# Patient Record
Sex: MALE | Race: WHITE | NOT HISPANIC OR LATINO
[De-identification: names, ages, dates, MRNs, and addresses within clinical notes are randomized per-mention and may not be internally consistent; named-entity substitution may affect disease eponyms.]

---

## 2017-05-04 ENCOUNTER — NON-APPOINTMENT (OUTPATIENT)
Age: 32
End: 2017-05-04

## 2017-05-04 ENCOUNTER — APPOINTMENT (OUTPATIENT)
Dept: CARDIOLOGY | Facility: CLINIC | Age: 32
End: 2017-05-04

## 2017-05-04 VITALS
TEMPERATURE: 98.3 F | SYSTOLIC BLOOD PRESSURE: 120 MMHG | WEIGHT: 170 LBS | OXYGEN SATURATION: 100 % | HEART RATE: 65 BPM | DIASTOLIC BLOOD PRESSURE: 82 MMHG | HEIGHT: 69 IN | RESPIRATION RATE: 17 BRPM | BODY MASS INDEX: 25.18 KG/M2

## 2017-05-04 DIAGNOSIS — E78.5 HYPERLIPIDEMIA, UNSPECIFIED: ICD-10-CM

## 2017-06-20 ENCOUNTER — LABORATORY RESULT (OUTPATIENT)
Age: 32
End: 2017-06-20

## 2017-06-20 ENCOUNTER — APPOINTMENT (OUTPATIENT)
Dept: INTERNAL MEDICINE | Facility: CLINIC | Age: 32
End: 2017-06-20

## 2017-06-20 VITALS
HEART RATE: 64 BPM | RESPIRATION RATE: 17 BRPM | DIASTOLIC BLOOD PRESSURE: 65 MMHG | BODY MASS INDEX: 24.96 KG/M2 | SYSTOLIC BLOOD PRESSURE: 107 MMHG | TEMPERATURE: 97.6 F | WEIGHT: 169 LBS | OXYGEN SATURATION: 96 %

## 2017-06-20 DIAGNOSIS — Z00.00 ENCOUNTER FOR GENERAL ADULT MEDICAL EXAMINATION W/OUT ABNORMAL FINDINGS: ICD-10-CM

## 2017-06-20 DIAGNOSIS — L30.9 DERMATITIS, UNSPECIFIED: ICD-10-CM

## 2017-06-20 DIAGNOSIS — S92.309A FRACTURE OF UNSPECIFIED METATARSAL BONE(S), UNSPECIFIED FOOT, INITIAL ENCOUNTER FOR CLOSED FRACTURE: ICD-10-CM

## 2017-06-20 DIAGNOSIS — Z23 ENCOUNTER FOR IMMUNIZATION: ICD-10-CM

## 2017-06-21 ENCOUNTER — RESULT REVIEW (OUTPATIENT)
Age: 32
End: 2017-06-21

## 2017-06-21 DIAGNOSIS — F40.10 SOCIAL PHOBIA, UNSPECIFIED: ICD-10-CM

## 2017-06-21 DIAGNOSIS — R79.89 OTHER SPECIFIED ABNORMAL FINDINGS OF BLOOD CHEMISTRY: ICD-10-CM

## 2017-06-21 DIAGNOSIS — Z23 ENCOUNTER FOR IMMUNIZATION: ICD-10-CM

## 2017-06-21 DIAGNOSIS — R80.9 PROTEINURIA, UNSPECIFIED: ICD-10-CM

## 2017-06-21 LAB
ALBUMIN SERPL ELPH-MCNC: 4.3 G/DL
ALP BLD-CCNC: 85 U/L
ALT SERPL-CCNC: 40 U/L
ANION GAP SERPL CALC-SCNC: 12 MMOL/L
APPEARANCE: ABNORMAL
AST SERPL-CCNC: 34 U/L
BASOPHILS # BLD AUTO: 0.02 K/UL
BASOPHILS NFR BLD AUTO: 0.5 %
BILIRUB SERPL-MCNC: 0.4 MG/DL
BILIRUBIN URINE: NEGATIVE
BLOOD URINE: NEGATIVE
BUN SERPL-MCNC: 20 MG/DL
CALCIUM SERPL-MCNC: 9 MG/DL
CHLORIDE SERPL-SCNC: 102 MMOL/L
CHOLEST SERPL-MCNC: 163 MG/DL
CHOLEST/HDLC SERPL: 3.3 RATIO
CO2 SERPL-SCNC: 27 MMOL/L
COLOR: YELLOW
CREAT SERPL-MCNC: 1.34 MG/DL
EOSINOPHIL # BLD AUTO: 0.18 K/UL
EOSINOPHIL NFR BLD AUTO: 4.1 %
GLUCOSE QUALITATIVE U: NORMAL MG/DL
GLUCOSE SERPL-MCNC: 71 MG/DL
HBA1C MFR BLD HPLC: 5.4 %
HCT VFR BLD CALC: 41.4 %
HDLC SERPL-MCNC: 49 MG/DL
HGB BLD-MCNC: 13.3 G/DL
IMM GRANULOCYTES NFR BLD AUTO: 0.2 %
KETONES URINE: NEGATIVE
LDLC SERPL CALC-MCNC: 105 MG/DL
LEUKOCYTE ESTERASE URINE: NEGATIVE
LYMPHOCYTES # BLD AUTO: 1.56 K/UL
LYMPHOCYTES NFR BLD AUTO: 35.4 %
MAN DIFF?: NORMAL
MCHC RBC-ENTMCNC: 29.6 PG
MCHC RBC-ENTMCNC: 32.1 GM/DL
MCV RBC AUTO: 92 FL
MONOCYTES # BLD AUTO: 0.33 K/UL
MONOCYTES NFR BLD AUTO: 7.5 %
NEUTROPHILS # BLD AUTO: 2.31 K/UL
NEUTROPHILS NFR BLD AUTO: 52.3 %
NITRITE URINE: NEGATIVE
PH URINE: 7.5
PLATELET # BLD AUTO: 198 K/UL
POTASSIUM SERPL-SCNC: 4.4 MMOL/L
PROT SERPL-MCNC: 7.2 G/DL
PROTEIN URINE: NEGATIVE MG/DL
RBC # BLD: 4.5 M/UL
RBC # FLD: 13.7 %
SODIUM SERPL-SCNC: 141 MMOL/L
SPECIFIC GRAVITY URINE: 1.02
TRIGL SERPL-MCNC: 43 MG/DL
TSH SERPL-ACNC: 1.72 UIU/ML
UROBILINOGEN URINE: NORMAL MG/DL
WBC # FLD AUTO: 4.41 K/UL

## 2017-12-18 PROBLEM — Z23 NEED FOR INFLUENZA VACCINATION: Status: ACTIVE | Noted: 2017-12-18

## 2017-12-20 ENCOUNTER — APPOINTMENT (OUTPATIENT)
Dept: INTERNAL MEDICINE | Facility: CLINIC | Age: 32
End: 2017-12-20
Payer: MEDICAID

## 2017-12-20 DIAGNOSIS — Z23 ENCOUNTER FOR IMMUNIZATION: ICD-10-CM

## 2017-12-20 PROCEDURE — 90471 IMMUNIZATION ADMIN: CPT

## 2017-12-20 PROCEDURE — 90632 HEPA VACCINE ADULT IM: CPT

## 2019-02-20 ENCOUNTER — APPOINTMENT (OUTPATIENT)
Dept: CARDIOLOGY | Facility: CLINIC | Age: 34
End: 2019-02-20

## 2019-03-07 ENCOUNTER — APPOINTMENT (OUTPATIENT)
Dept: CARDIOLOGY | Facility: CLINIC | Age: 34
End: 2019-03-07
Payer: MEDICAID

## 2019-03-07 ENCOUNTER — NON-APPOINTMENT (OUTPATIENT)
Age: 34
End: 2019-03-07

## 2019-03-07 VITALS
SYSTOLIC BLOOD PRESSURE: 129 MMHG | OXYGEN SATURATION: 100 % | BODY MASS INDEX: 24.37 KG/M2 | TEMPERATURE: 97.5 F | RESPIRATION RATE: 17 BRPM | WEIGHT: 165 LBS | HEART RATE: 70 BPM | DIASTOLIC BLOOD PRESSURE: 73 MMHG

## 2019-03-07 DIAGNOSIS — R94.31 ABNORMAL ELECTROCARDIOGRAM [ECG] [EKG]: ICD-10-CM

## 2019-03-07 PROCEDURE — 93000 ELECTROCARDIOGRAM COMPLETE: CPT

## 2019-03-07 PROCEDURE — 99213 OFFICE O/P EST LOW 20 MIN: CPT

## 2019-03-07 NOTE — HISTORY OF PRESENT ILLNESS
[FreeTextEntry1] : Patient is feeling well. His ECG is normal. Patient denies CP, SOB, palpitations, or lightheadedness. Patient reports that he weight trains 4 times a week and plays tennis once a week. \par \par 33-year-old male with HLD  presents for routine evaluation.  Patient was last seen on 5/4/17.\par \par (1) HLD - He is scheduled to see his PCP for physical.  His ECG is normal.  Patient was reassured. \par \par (2) Followup - as needed.\par \par 4/18/16.  Patient wants an ECG.  Patient denies CP, SOB, palpitations, or lightheadedness.  Patient reports runny nose and sore throat; he thinks it may be due allergy.

## 2019-03-19 PROBLEM — R94.31 ABNORMAL ECG: Status: ACTIVE | Noted: 2019-03-19

## 2020-10-31 NOTE — DISCUSSION/SUMMARY
[FreeTextEntry1] : 32-year-old male with HLD  presents for routine evaluation.  Patient was last seen on 4/18/16.  Patient wants an ECG.  Patient denies CP, SOB, palpitations, or lightheadedness.  Patient reports runny nose and sore throat; he thinks it may be due allergy.\par \par (1) HLD - He is scheduled to see his PCP for physical.  His ECG is normal.  Patient was reassured. \par \par (2) Followup - as needed.
Never smoker

## 2024-08-05 ENCOUNTER — NON-APPOINTMENT (OUTPATIENT)
Age: 39
End: 2024-08-05

## 2024-08-08 ENCOUNTER — APPOINTMENT (OUTPATIENT)
Dept: UROLOGY | Facility: CLINIC | Age: 39
End: 2024-08-08

## 2024-08-08 PROBLEM — R35.0 FREQUENCY OF MICTURITION: Status: ACTIVE | Noted: 2024-08-08

## 2024-08-08 PROCEDURE — 51798 US URINE CAPACITY MEASURE: CPT

## 2024-08-08 PROCEDURE — G2211 COMPLEX E/M VISIT ADD ON: CPT | Mod: NC

## 2024-08-08 PROCEDURE — 99203 OFFICE O/P NEW LOW 30 MIN: CPT

## 2024-08-10 NOTE — ADDENDUM
[FreeTextEntry1] : Entered by Ricci Esquivel, acting as scribe for Dr. Harrison Wade. The documentation recorded by the scribe accurately reflects the service I personally performed and the decisions made by me.

## 2024-08-10 NOTE — LETTER BODY
[FreeTextEntry1] : Jessica Fraser, DO  [address] [place] [address] [phone]  Dear Dr. Fraser,   Reason for visit: Urinary frequency.   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 Patient reports progressive symptoms of urinary frequency and urgency every 1-2 hours in the last 3 month. He denies any gross hematuria, dysuria or urinary incontinence. The patient does not smoke. His symptoms are aggravated by hydration. He has occasional nocturia. The patient denies any relieving factors. The patient denies any interference of function. The patient is entirely asymptomatic. He denies any history of glaucoma. All other review of systems are negative. He he has no cancer in her family medical history. He has no previous surgical history. Past medical history, family history and social history were inquired and were noncontributory to current condition. The patient does not use tobacco or drink alcohol. Medications and allergies were reviewed. He has no known allergies to medication.    On examination, the patient is a well appearing gentleman in no acute distress. He is alert and oriented and follows commands. He has normal mood and affect. He is normocephalic. Oral no thrush. Neck is supple. Respirations are unlabored. His abdomen is soft and nontender. Liver is nonpalpable. Bladder is nonpalpable. No CVA tenderness. Neurologically he is grossly intact. No peripheral edema. Skin without gross abnormality. He has normal male external genitalia. Normal meatus. Bilateral testes are descended intrascrotally and normal to palpation. On rectal examination, there is normal sphincter tone. The prostate is clinically benign without focal induration or nodularity.    Post-void residual on bladder scan today was 0 cc.    ASSESSMENT: Urinary frequency.   I counseled the patient. I discussed the various etiologies of urinary frequency. I discussed the management options of the will to the patient. His PVR today was 0 cc. Given the symptoms, I recommend that the patient begin a trial of Oxybutynin 5 mg. He declined Oxybutynin. I recommended the patient obtain PSA and BMP today to establish baseline. He will also obtain urinalysis and urine culture to look for infections. Risks and alternatives were discussed. I answered the patient's questions. The patient will follow-up as directed and will contact me with any questions or concerns. Thank you for the opportunity to participate in the care of Mr. ORTIZ. I will keep you updated on her progress.   PLAN: Declined Oxybutynin. PSA. BMP. PVR. Urinalysis. urine culture. Follow up in 6 months.

## 2024-08-10 NOTE — LETTER BODY
[FreeTextEntry1] : Jessica Fraser, DO  [address] [place] [address] [phone]  Dear Dr. Fraser,   Reason for visit: Urinary frequency.   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 Patient reports progressive symptoms of urinary frequency and urgency every 1-2 hours in the last 3 month. He denies any gross hematuria, dysuria or urinary incontinence. The patient does not smoke. His symptoms are aggravated by hydration. He has occasional nocturia. The patient denies any relieving factors. The patient denies any interference of function. The patient is entirely asymptomatic. He denies any history of glaucoma. All other review of systems are negative. He he has no cancer in her family medical history. He has no previous surgical history. Past medical history, family history and social history were inquired and were noncontributory to current condition. The patient does not use tobacco or drink alcohol. Medications and allergies were reviewed. He has no known allergies to medication.    On examination, the patient is a well appearing gentleman in no acute distress. He is alert and oriented and follows commands. He has normal mood and affect. He is normocephalic. Oral no thrush. Neck is supple. Respirations are unlabored. His abdomen is soft and nontender. Liver is nonpalpable. Bladder is nonpalpable. No CVA tenderness. Neurologically he is grossly intact. No peripheral edema. Skin without gross abnormality. He has normal male external genitalia. Normal meatus. Bilateral testes are descended intrascrotally and normal to palpation. On rectal examination, there is normal sphincter tone. The prostate is clinically benign without focal induration or nodularity.    Post-void residual on bladder scan today was 0 cc.    ASSESSMENT: Urinary frequency.   I counseled the patient. I discussed the various etiologies of urinary frequency. I discussed the management options of the will to the patient. His PVR today was 0 cc. Given the symptoms, I recommend that the patient begin a trial of Oxybutynin 5 mg. He declined Oxybutynin. I recommended the patient obtain PSA and BMP today to establish baseline. He will also obtain urinalysis and urine culture to look for infections. Risks and alternatives were discussed. I answered the patient's questions. The patient will follow-up as directed and will contact me with any questions or concerns. Thank you for the opportunity to participate in the care of Mr. ORTIZ. I will keep you updated on her progress.   PLAN: Declined Oxybutynin. PSA. BMP. PVR. Urinalysis. urine culture. Follow up in 6 months.

## 2024-08-10 NOTE — HISTORY OF PRESENT ILLNESS
[FreeTextEntry1] : Frequent urination.  Patient is a documentary filmmaker.  Progressive symptoms in in the last 3 months. PVR 0. PSA.  No infection, patient declined the oxybutynin.  Follow-up 6 months.  Please refer to URO Consult Note.

## 2024-08-13 ENCOUNTER — NON-APPOINTMENT (OUTPATIENT)
Age: 39
End: 2024-08-13

## 2025-03-13 ENCOUNTER — APPOINTMENT (OUTPATIENT)
Dept: UROLOGY | Facility: CLINIC | Age: 40
End: 2025-03-13